# Patient Record
Sex: MALE | Race: WHITE | Employment: STUDENT | URBAN - METROPOLITAN AREA
[De-identification: names, ages, dates, MRNs, and addresses within clinical notes are randomized per-mention and may not be internally consistent; named-entity substitution may affect disease eponyms.]

---
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
## 2023-02-11 ENCOUNTER — NURSE TRIAGE (OUTPATIENT)
Dept: FAMILY MEDICINE CLINIC | Facility: CLINIC | Age: 18
End: 2023-02-11

## 2023-02-11 NOTE — TELEPHONE ENCOUNTER
Pt advised , only 1 appt available but does not accomodate pt school schedule advised UC, will keep doing home care , if fever start will make appt or go to [facility]

## 2023-06-19 ENCOUNTER — TELEPHONE (OUTPATIENT)
Dept: FAMILY MEDICINE CLINIC | Facility: CLINIC | Age: 18
End: 2023-06-19

## 2023-10-15 ENCOUNTER — HOSPITAL ENCOUNTER (EMERGENCY)
Facility: HOSPITAL | Age: 18
Discharge: HOME | End: 2023-10-15
Attending: STUDENT IN AN ORGANIZED HEALTH CARE EDUCATION/TRAINING PROGRAM
Payer: COMMERCIAL

## 2023-10-15 VITALS
DIASTOLIC BLOOD PRESSURE: 71 MMHG | RESPIRATION RATE: 20 BRPM | OXYGEN SATURATION: 96 % | TEMPERATURE: 98 F | HEIGHT: 76 IN | HEART RATE: 100 BPM | WEIGHT: 185 LBS | BODY MASS INDEX: 22.53 KG/M2 | SYSTOLIC BLOOD PRESSURE: 131 MMHG

## 2023-10-15 DIAGNOSIS — J10.1 INFLUENZA A: ICD-10-CM

## 2023-10-15 DIAGNOSIS — J02.0 STREP PHARYNGITIS: Primary | ICD-10-CM

## 2023-10-15 LAB
FLUAV + FLUBV RNA SPEC NAA+PROBE: NEGATIVE
RSV RNA SPEC NAA+PROBE: NEGATIVE
S PYO AG THROAT QL: POSITIVE
SARS-COV-2 RNA RESP QL NAA+PROBE: NOT DETECTED

## 2023-10-15 PROCEDURE — 96372 THER/PROPH/DIAG INJ SC/IM: CPT

## 2023-10-15 PROCEDURE — 0241U SARS-COV-2/FLU A AND B/RSV BY PCR (GENEXPERT): CPT | Performed by: STUDENT IN AN ORGANIZED HEALTH CARE EDUCATION/TRAINING PROGRAM

## 2023-10-15 PROCEDURE — 99283 EMERGENCY DEPT VISIT LOW MDM: CPT

## 2023-10-15 PROCEDURE — 87880 STREP A ASSAY W/OPTIC: CPT

## 2023-10-15 RX ORDER — KETOROLAC TROMETHAMINE 15 MG/ML
30 INJECTION, SOLUTION INTRAMUSCULAR; INTRAVENOUS ONCE
Status: COMPLETED | OUTPATIENT
Start: 2023-10-15 | End: 2023-10-15

## 2023-10-15 RX ORDER — DEXAMETHASONE SODIUM PHOSPHATE 10 MG/ML
10 INJECTION, SOLUTION INTRAMUSCULAR; INTRAVENOUS ONCE
Status: COMPLETED | OUTPATIENT
Start: 2023-10-15 | End: 2023-10-15

## 2023-10-15 RX ORDER — ACETAMINOPHEN 500 MG
1000 TABLET ORAL ONCE
Status: COMPLETED | OUTPATIENT
Start: 2023-10-15 | End: 2023-10-15

## 2023-10-15 RX ORDER — AMOXICILLIN 875 MG/1
875 TABLET, COATED ORAL 2 TIMES DAILY
Qty: 20 TABLET | Refills: 0 | Status: SHIPPED | OUTPATIENT
Start: 2023-10-15 | End: 2023-10-25

## 2023-10-15 NOTE — ED INITIAL ASSESSMENT (HPI)
Patient arrives ambulatory through triage with complaints of sinus issues x5 days, chills, sore throat, cough.    Negative COVID home test.

## 2023-10-15 NOTE — ED QUICK NOTES
Patient safe to DC home per MD. Keisha Nathan to dress self. DC teaching done, instructions reviewed with patient including when and how to follow up with healthcare providers and when to seek emergency care. The patient verbalizes understanding. Patient ambulatory with steady gait to exit.

## 2023-10-16 ENCOUNTER — NURSE TRIAGE (OUTPATIENT)
Dept: FAMILY MEDICINE CLINIC | Facility: CLINIC | Age: 18
End: 2023-10-16

## 2023-10-16 ENCOUNTER — PATIENT OUTREACH (OUTPATIENT)
Dept: CASE MANAGEMENT | Age: 18
End: 2023-10-16

## 2023-10-16 NOTE — TELEPHONE ENCOUNTER
Action Requested: Summary for Provider     []  Critical Lab, Recommendations Needed  [] Need Additional Advice  [x]   FYI    []   Need Orders  [] Need Medications Sent to Pharmacy  []  Other     SUMMARY: Per protocol home care advised. Reason for call: Bleeding--blood-tinged sputum x 1 with cough for one week    Patient calling, confirmed name and . Diagnosed and treated for strep and flu at ER yesterday. He states that he has been coughing since 10/10 and today he had a small amount of blood in his sputum when he coughed. He states that he believes it is from coughing a lot, is not worried because it was a small amount, and is calling because his mother is worried. Denies blood thinners, aspirin, Alleve, NSAID's. Taking Mucinex DM at night and using bedside humidifier. He states that he feels that he is slowly improving from his illness and denies any concerns at this time. Advised him on reasons to call back and encouraged humidifier, saline nasal spray, steamy showers, and plenty of fluids. Patient verbalized understanding and agrees.       Reason for Disposition   Coughing up blood    Protocols used: Coughing Up Blood-A-OH

## 2025-03-27 ENCOUNTER — OFFICE VISIT (OUTPATIENT)
Dept: FAMILY MEDICINE CLINIC | Facility: CLINIC | Age: 20
End: 2025-03-27
Payer: COMMERCIAL

## 2025-03-27 VITALS
HEART RATE: 109 BPM | WEIGHT: 204 LBS | BODY MASS INDEX: 24.84 KG/M2 | TEMPERATURE: 98 F | OXYGEN SATURATION: 96 % | SYSTOLIC BLOOD PRESSURE: 124 MMHG | HEIGHT: 76 IN | DIASTOLIC BLOOD PRESSURE: 76 MMHG

## 2025-03-27 DIAGNOSIS — M25.561 ACUTE PAIN OF BOTH KNEES: Primary | ICD-10-CM

## 2025-03-27 DIAGNOSIS — M25.562 ACUTE PAIN OF BOTH KNEES: Primary | ICD-10-CM

## 2025-03-27 PROCEDURE — 3008F BODY MASS INDEX DOCD: CPT | Performed by: FAMILY MEDICINE

## 2025-03-27 PROCEDURE — 90651 9VHPV VACCINE 2/3 DOSE IM: CPT | Performed by: FAMILY MEDICINE

## 2025-03-27 PROCEDURE — 99213 OFFICE O/P EST LOW 20 MIN: CPT | Performed by: FAMILY MEDICINE

## 2025-03-27 PROCEDURE — 90471 IMMUNIZATION ADMIN: CPT | Performed by: FAMILY MEDICINE

## 2025-03-27 PROCEDURE — 90472 IMMUNIZATION ADMIN EACH ADD: CPT | Performed by: FAMILY MEDICINE

## 2025-03-27 PROCEDURE — 3074F SYST BP LT 130 MM HG: CPT | Performed by: FAMILY MEDICINE

## 2025-03-27 PROCEDURE — 3078F DIAST BP <80 MM HG: CPT | Performed by: FAMILY MEDICINE

## 2025-03-27 PROCEDURE — 90715 TDAP VACCINE 7 YRS/> IM: CPT | Performed by: FAMILY MEDICINE

## 2025-03-27 NOTE — PROGRESS NOTES
HPI:    Balwinder Musa is a 20 year old male presents to clinic with concerns regarding both of his knees.  States that lately, his left knee has been causing a lot of pain.  He was squatting and accidentally stumbled forward and slammed his knee against the ground.  Noticed a lot of swelling and sharp pains.  Swelling has resolved, but he still has sharp pain when going from sitting to standing and when he squats down to the ground.  Dull pain while walking, no pain at rest.  Few days back, patient also fell against his right knee.  Denies swelling, but now has pain with certain movements.  Dull, 2 out of 10.  No swelling in either joint, states that his knees do not \"give out on him.\"    HISTORY:  Past Medical History:    Astigmatism, bilateral    astigmatism OU    Horizontal Pendular nystagmus    Myopia of both eyes with astigmatism    Myopia of right eye    myopia OD    Nystagmus, congenital    congenital pendular horizontal nystagmus OU      No past surgical history on file.   Family History   Problem Relation Age of Onset    Glaucoma Maternal Grandmother     Glaucoma Maternal Grandfather     Diabetes Other         Grt grandfather (m)    Macular degeneration Neg         family h/o      Social History:   Social History     Socioeconomic History    Marital status: Single   Tobacco Use    Smoking status: Never    Smokeless tobacco: Never   Vaping Use    Vaping status: Never Used   Substance and Sexual Activity    Alcohol use: No    Drug use: No   Other Topics Concern    Caffeine Concern No        Medications (Active prior to today's visit):  Current Outpatient Medications   Medication Sig Dispense Refill    Multiple Vitamins-Minerals (ADULT GUMMY) Oral Chew Tab Chew 2 each by mouth daily.      Rizatriptan Benzoate 10 MG Oral Tablet Dispersible Take 1 tablet (10 mg total) by mouth as needed for Migraine. 15 tablet 0       Allergies:  Allergies[1]    ROS:   Review of Systems   All other systems reviewed and are  negative.      PHYSICAL EXAM:     Vitals:    03/27/25 1049   BP: 124/76   BP Location: Right arm   Patient Position: Sitting   Cuff Size: adult   Pulse: 109   Temp: 97.9 °F (36.6 °C)   TempSrc: Temporal   SpO2: 96%   Weight: 204 lb (92.5 kg)   Height: 6' 4\" (1.93 m)     Physical Exam  Vitals reviewed.   Cardiovascular:      Rate and Rhythm: Normal rate.   Pulmonary:      Effort: Pulmonary effort is normal. No respiratory distress.   Musculoskeletal:      Comments: Right knee - No swelling or point tenderness, no visible deformities, Ant/Post Drawer test - neg, Lachman Test - neg, ROM - WNL    Left knee - No swelling or point tenderness, no visible deformities, Ant/Post Drawer test - neg, Lachman Test - neg, ROM - WNL     Neurological:      Mental Status: He is alert.         ASSESSMENT/PLAN:   (M25.561,  M25.562) Acute pain of both knees  (primary encounter diagnosis)  Plan: Physical Therapy Referral - [facility]  -Unremarkable exam of knees.  Supportive care measures discussed.  Physical therapy referral placed.  Follow-up if symptoms worsen, do not improve.                   Responsible party/patient verbalized understanding of information discussed. No barriers to learning observed.            Orders This Visit:  Orders Placed This Encounter   Procedures    TETANUS, DIPHTHERIA TOXOIDS AND ACELLULAR PERTUSIS VACCINE (TDAP), >7 YEARS, IM USE    GARDASIL 9       Meds This Visit:  Requested Prescriptions      No prescriptions requested or ordered in this encounter       Imaging & Referrals:  PHYSICAL THERAPY - INTERNAL  TETANUS, DIPHTHERIA TOXOIDS AND ACELLULAR PERTUSIS VACCINE (TDAP), >7 YEARS, IM USE  HPV HUMAN PAPILLOMA VIRUS VACC 9 RAUL 3 DOSE IM     Chaperone offered at visit today.     The 21st Century cures Act makes medical notes like these available to patients in the interest of transparency.  However, be advised that this is a medical document.  It is intended as peer to peer communication.  It is  written in medical language and may contain abbreviations or verbiage that are unfamiliar.  It may appear blunt or direct.  Medical documents are intended to carry relevant information, facts as evident, and the clinical opinion of the practitioner.      This note was created by N-1-1 voice recognition. Errors in content may be related to improper recognition by the system; efforts to review and correct have been done but errors may still exist. Please contact me with any questions.       3/27/2025  Mandeep Bradford MD       [1]   Allergies  Allergen Reactions    No Known Allergies      Other reaction(s): NO KNOWN ALLERGIES

## 2025-06-05 ENCOUNTER — HOSPITAL ENCOUNTER (EMERGENCY)
Facility: HOSPITAL | Age: 20
Discharge: HOME | End: 2025-06-05
Attending: EMERGENCY MEDICINE
Payer: COMMERCIAL

## 2025-06-05 ENCOUNTER — APPOINTMENT (OUTPATIENT)
Dept: GENERAL RADIOLOGY | Facility: HOSPITAL | Age: 20
End: 2025-06-05
Payer: COMMERCIAL

## 2025-06-05 ENCOUNTER — NURSE TRIAGE (OUTPATIENT)
Facility: CLINIC | Age: 20
End: 2025-06-05

## 2025-06-05 VITALS
DIASTOLIC BLOOD PRESSURE: 88 MMHG | HEART RATE: 82 BPM | OXYGEN SATURATION: 98 % | RESPIRATION RATE: 18 BRPM | TEMPERATURE: 98 F | SYSTOLIC BLOOD PRESSURE: 128 MMHG

## 2025-06-05 DIAGNOSIS — J40 BRONCHITIS: Primary | ICD-10-CM

## 2025-06-05 LAB
FLUAV + FLUBV RNA SPEC NAA+PROBE: NEGATIVE
FLUAV + FLUBV RNA SPEC NAA+PROBE: NEGATIVE
RSV RNA SPEC NAA+PROBE: NEGATIVE
SARS-COV-2 RNA RESP QL NAA+PROBE: NOT DETECTED

## 2025-06-05 PROCEDURE — 99284 EMERGENCY DEPT VISIT MOD MDM: CPT

## 2025-06-05 PROCEDURE — 71045 X-RAY EXAM CHEST 1 VIEW: CPT

## 2025-06-05 PROCEDURE — 0241U SARS-COV-2/FLU A AND B/RSV BY PCR (GENEXPERT): CPT | Performed by: EMERGENCY MEDICINE

## 2025-06-05 RX ORDER — ALBUTEROL SULFATE 90 UG/1
2 INHALANT RESPIRATORY (INHALATION) EVERY 4 HOURS PRN
Qty: 1 EACH | Refills: 0 | Status: SHIPPED | OUTPATIENT
Start: 2025-06-05 | End: 2025-07-05

## 2025-06-05 NOTE — ED QUICK NOTES
Patient brought into ED room 20 from  c/o cough.Patient reports he developed a cough about 2.5 weeks ago on a flight back to [place] from [place]. Cough has not gotten better, and it is interrupting his sleep and work.   Patient A&Ox4  Patient side rails upx2, bed in lowest position, and call light within reach. Care continues

## 2025-06-05 NOTE — ED INITIAL ASSESSMENT (HPI)
20y M to ED via personal car with c/o cough. Patient reports he developed a cough about 2.5 weeks ago on a flight back to [place] from [place]. Cough has not gotten better, and it is interrupting his sleep and work.

## 2025-06-05 NOTE — ED PROVIDER NOTES
Patient Seen in: [facility] Emergency Department        History  Chief Complaint   Patient presents with    Cough     Stated Complaint: cough x2 weeks    Subjective:   HPI            Patient is a 20-year-old male with no significant past medical history who complains of a cough for almost 2 weeks.  He denies runny nose or sinus congestion.  He has been taking Claritin and Delsym no chest pain.  No shortness of breath.  No fever.  Recent vacation to [place]      Objective:     Past Medical History:    Astigmatism, bilateral    astigmatism OU    Horizontal Pendular nystagmus    Myopia of both eyes with astigmatism    Myopia of right eye    myopia OD    Nystagmus, congenital    congenital pendular horizontal nystagmus OU              History reviewed. No pertinent surgical history.             Social History     Socioeconomic History    Marital status: Single   Tobacco Use    Smoking status: Never    Smokeless tobacco: Never   Vaping Use    Vaping status: Never Used   Substance and Sexual Activity    Alcohol use: No    Drug use: No   Other Topics Concern    Caffeine Concern No                                Physical Exam    ED Triage Vitals [06/05/25 1633]   /79   Pulse 85   Resp 18   Temp 97.8 °F (36.6 °C)   Temp src Temporal   SpO2 99 %   O2 Device None (Room air)       Current Vitals:   Vital Signs  BP: 136/79  Pulse: 85  Resp: 18  Temp: 97.8 °F (36.6 °C)  Temp src: Temporal    Oxygen Therapy  SpO2: 99 %  O2 Device: None (Room air)            Physical Exam  Constitutional: Oriented to person, place, and time. Appears well-developed and well-nourished.   HEENT:   Head: Normocephalic and atraumatic.   Right Ear: External ear normal.   Left Ear: External ear normal.   Nose: Nose normal.   Mouth/Throat: Oropharynx is clear and moist.   Eyes: Conjunctivae and EOM are normal. Pupils are equal, round, and reactive to light.   Neck: Neck supple.   Cardiovascular: Normal rate, regular rhythm, normal heart sounds  and intact distal pulses.    Pulmonary/Chest: Effort normal and breath sounds normal. No respiratory distress.   Abdominal: Soft. Bowel sounds are normal. Exhibits no distension and no mass. There is no tenderness. There is no rebound and no guarding.   Musculoskeletal: Normal range of motion. Exhibits no edema or tenderness.   Lymphadenopathy: No cervical adenopathy.   Neurological: Alert and oriented to person, place, and time. Normal reflexes. No cranial nerve deficit. No motor os sensory defecits noted Coordination normal.   Skin: Skin is warm and dry.   Psychiatric: Normal mood and affect. Behavior is normal. Judgment and thought content normal.   Nursing note and vitals reviewed.            ED Course  Labs Reviewed   SARS-COV-2/FLU A AND B/RSV BY PCR (GENEXPERT) - Normal    Narrative:     This test is intended for the qualitative detection and differentiation of SARS-CoV-2, influenza A, influenza B, and respiratory syncytial virus (RSV) viral RNA in nasopharyngeal or nares swabs from individuals suspected of respiratory viral infection consistent with COVID-19 by their healthcare provider. Signs and symptoms of respiratory viral infection due to SARS-CoV-2, influenza, and RSV can be similar.    Test performed using the Xpert Xpress SARS-CoV-2/FLU/RSV (real time RT-PCR)  assay on the GeneXpert instrument, [employer], [place] [address].   This test is being used under the Food and Drug Administration's Emergency Use Authorization.    The authorized Fact Sheet for Healthcare Providers for this assay is available upon request from the laboratory.                            MDM     Use of independent historian: Mom at bedside helps with history    I personally reviewed and interpreted the images : No infiltrate seen on chest x-ray    XR CHEST AP PORTABLE  (CPT=71045)  Result Date: 6/5/2025  CONCLUSION: No acute cardiopulmonary abnormality.    Dictated by (CST): Gregg Masters MD on 6/05/2025 at 6:47 PM      Finalized by (CST): Gregg Masters MD on 6/05/2025 at 6:47 PM            Vitals:    06/05/25 1633   BP: 136/79   Pulse: 85   Resp: 18   Temp: 97.8 °F (36.6 °C)   TempSrc: Temporal   SpO2: 99%     *I personally reviewed and interpreted all ED vitals.    Pulse Ox: 99%, Room air, Normal     EKG interpretation above independently interpreted by me    Monitor Interpretation:   normal sinus rhythm independently interpreted by me    Differential Diagnosis/ Diagnostic Considerations: Patient with 2 weeks of cough consider pneumonia consider bronchitis consider COVID consider influenza    Medical Record Review: I personally reviewed available prior medical records for any recent pertinent discharge summaries, testing, and procedures and reviewed those reports and found telephone triage today and ED referral note.    Complicating Factors: The patient already has  which contribute to the complexity of this ED evaluation.    Social determinants of health:    Prescription drug management:      Shared Decision Making:    ED Course: X-ray and GEN expert findings discussed with patient will discharge home with prescription for Ventolin inhaler and outpatient follow-up plan he understands and agrees    Discussion of management with other healthcare providers:    Condition upon leaving the department: Stable          Medical Decision Making      Disposition and Plan     Clinical Impression:  1. Bronchitis         Disposition:  Discharge  6/5/2025  7:15 pm    Follow-up:  Peggy Eli MD  [address] [address] [phone]    Follow up in 3 day(s)            Medications Prescribed:  Current Discharge Medication List        START taking these medications    Details   albuterol 108 (90 Base) MCG/ACT Inhalation Aero Soln Inhale 2 puffs into the lungs every 4 (four) hours as needed for Wheezing.  Qty: 1 each, Refills: 0                   Supplementary Documentation:

## 2025-06-11 ENCOUNTER — PATIENT OUTREACH (OUTPATIENT)
Dept: CASE MANAGEMENT | Age: 20
End: 2025-06-11

## 2025-06-11 NOTE — PROGRESS NOTES
Patient had recent Emergency Room visit, calling to offer Primary Care Physician follow-up appointment (discharged 06/05)    Dr Peggy Eli  Family Medicine  [address] [place] [address]  [phone]    Attempt #1:  Left message on voicemail for patient to call transitions specialist back to schedule follow up appointments. Provided Transitions specialist scheduling phone number [phone].

## 2025-06-12 NOTE — PROGRESS NOTES
ED Hospital Follow up for(discharged 06/05 [facility])      PCP  Peggy Eli  Family Medicine  [address] [place] [address]  [phone]  Unable to contact pt after multiple attempt  Attempt #2:  Left message on voicemail for patient to call transitions specialist back to schedule follow up appointments. Provided Transitions specialist scheduling phone number [phone]. Closing encounter. Will re-open if patient returns call.

## (undated) NOTE — ED AVS SNAPSHOT
Paulette Jimenez   MRN: [id number]    Department:  [facility] Emergency Department   Date of Visit:  9/18/2017           Disclosure     Insurance plans vary and the physician(s) referred by the ER may not be covered by your plan.  Please contact CARE PHYSICIAN AT ONCE OR RETURN IMMEDIATELY TO THE EMERGENCY DEPARTMENT. If you have been prescribed any medication(s), please fill your prescription right away and begin taking the medication(s) as directed.   If you believe that any of the medications

## (undated) NOTE — LETTER
[facility] [employer] of Child Health Examination       Student's Name  TA SHARMA Date     Signature                                                                                                                                              Title                           Date    (If adding dates to the above immu ALLERGIES  (Food, drug, insect, other) MEDICATION  (List all prescribed or taken on a regular basis.)     Diagnosis of asthma?   Child wakes during the night coughing   Yes   No    Yes   No    Loss of function of one of paired organs? (eye/ear/kidney/testic Family History ---   Ethnic Minority  ---          Signs of Insulin Resistance (hypertension, dyslipidemia, polycystic ovarian syndrome, acanthosis nigricans)    ---           At Risk  ---   Lead Risk Questionnaire  Req'd for children 6 months thru 6 yrs e Controller medication (e.g. inhaled corticosteroid):   No Other   NEEDS/MODIFICATIONS required in the school setting  None DIETARY Needs/Restrictions     None   SPECIAL INSTRUCTIONS/DEVICES e.g. safety glasses, glass eye, chest protector for arrhyt

## (undated) NOTE — MR AVS SNAPSHOT
[facility]  [address], [address] [place]  [phone]               Thank you for choosing us for your health care visit with Nimisha Polanco MD.  We are glad to serve you and happy to provide you with this summary o appointment. Failure to obtain required authorization numbers can create reimbursement difficulties for you.     Assoc Dx:  Nystagmus [H55.00]          Reason for Today's Visit     Sports Physical           Medical Issues Discussed Today     Sports physical

## (undated) NOTE — LETTER
July 7, 2017    Inessa Jack MD  [address] [facility]     Patient: Vijaya Amaya   YOB: 2005   Date of Visit: 7/7/2017       Dear Dr. Aleida Graf MD:    Thank you for referring Vijaya Amaya to me for evaluation PHYSICAL EXAM:     Base Eye Exam     Visual Acuity (Snellen - Linear)       Right Left    Dist sc 20/40 20/50 +2    Dist ph sc NI NI    Patient was fogged with +5.00 on the opposite eye to check vision          Tonometry (Applanation, 12:10 PM)       Right No Follow-up on file. 7/7/2017  Scribed by: Cristhian Ward MD      If you have questions, please do not hesitate to call me. I look forward to following Rhett Mendoza along with you. Sincerely,        Cristhian Villanueva.  Bella Ward MD        CC: No Recipients

## (undated) NOTE — LETTER
September 18, 2017    Patient: Raphael Petit   Date of Visit: 9/18/2017       To Whom It May Concern:    Raphael Petit was seen and treated in our emergency department on 9/18/2017. He should not return to school until 09/21/17.     If you have an

## (undated) NOTE — LETTER
[facility] [employer] of Child Health Examination       Student's Name  Manny Rosas Title                           Date     Signature HEALTH HISTORY          TO BE COMPLETED AND SIGNED BY PARENT/GUARDIAN AND VERIFIED BY HEALTH CARE PROVIDER    ALLERGIES  (Food, drug, insect, other)  No Known Allergies MEDICATION  (List all prescribed or taken on a regular basis.)    Current Outpatient Me Date     PHYSICAL EXAMINATION REQUIREMENTS    Entire section below to be completed by MD//APN/PA       PHYSICAL EXAMINATION REQUIREMENTS (head circumference if <33 years old):   /79 (BP Location: Right arm, Ears Yes                      Screen result: Gastrointestinal Yes    Eyes Yes     Screen result:   Genito-Urinary Yes  LMP   Nose Yes  Neurological Yes    Throat Yes  Musculoskeletal Yes    Mouth/Dental Yes  Spinal examination Yes    Cardiovascular/HTN Yes Rev 11/15                                                                    Printed by the [employer]

## (undated) NOTE — LETTER
August 14, 2020    Miguel Raygoza DO  [address] [facility]     Patient: Esau Long   YOB: 2005   Date of Visit: 8/14/2020       Dear Dr. Thelma Francis DO:    Thank you for referring Esau Long to me Respiratory, Psychiatric, Allergic/Imm, Heme/Lymph    Last edited by Kendra Brothers O.T. on 8/14/2020  9:11 AM. (History)          PHYSICAL EXAM:     Base Eye Exam     Visual Acuity (Snellen - Linear)       Right Left    Dist cc 20/40 -2 20/50 +2    Near Horizontal Pendular nystagmus  Stable, 20/40 vision in each eye best corrected. Both eyes 20/30     Myopia of both eyes  Glasses for distance as needed. Night driving. No orders of the defined types were placed in this encounter.       Meds This Visit:

## (undated) NOTE — LETTER
VACCINE ADMINISTRATION RECORD  PARENT / GUARDIAN APPROVAL  Date: 2020  Vaccine administered to: Braden Rodriguez     : 2005    MRN: [id number]    A copy of the appropriate Centers for Disease Control and Prevention Vaccine Information statemen

## (undated) NOTE — LETTER
9/25/2017          To Whom It May Concern:    Zeny Zendejas has been under my medical care and may return to school at this time. Please excuse Elder Counts for 1 weeks beginning 09/18/17. He may return to school on 09/25/17.   Activity is restricted as

## (undated) NOTE — LETTER
Kristy 3, 2019    Althe[address] [facility]     Patient: Melissa Reis   YOB: 2005   Date of Visit: 6/3/2019       Dear Dr. Adhikari Single, DO:    Thank you for referring Melissa Reis to me for Genitourinary, Musculoskeletal, HENT, Endocrine, Cardiovascular, Respiratory, Psychiatric, Allergic/Imm, Heme/Lymph    Last edited by Rosa Abrams OMANA on 6/3/2019  2:33 PM. (History)          PHYSICAL EXAM:     Base Eye Exam     Visual Acuity Wendy Cerdaigham

## (undated) NOTE — LETTER
July 6, 2019    Carissa Rojo DO  [address] [facility]     Patient: Brianna Sullivan   YOB: 2005   Date of Visit: 7/5/2019       Dear Dr. Darien Vergara DO:    Thank you for referring Brianna Sullivan to me for Negative for: Constitutional, Gastrointestinal, Neurological, Skin, Genitourinary, Musculoskeletal, HENT, Endocrine, Cardiovascular, Respiratory, Psychiatric, Allergic/Imm, Heme/Lymph    Last edited by Steph Heredia OT on 7/5/2019 12:02 PM. (History) Cycloplegic Refraction Comments    20/30- with both eyes open            Final Rx       Sphere Cylinder Dist VA    Right -0.50 Sphere 20/40-    Left -0.75 Sphere 20/40    Type:  Single vision                 ASSESSMENT/PLAN:     Diagnoses and Plan:     Co